# Patient Record
Sex: MALE | Race: WHITE | NOT HISPANIC OR LATINO | Employment: STUDENT | URBAN - METROPOLITAN AREA
[De-identification: names, ages, dates, MRNs, and addresses within clinical notes are randomized per-mention and may not be internally consistent; named-entity substitution may affect disease eponyms.]

---
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
## 2022-04-18 NOTE — PROGRESS NOTES
10:00 Subjective:      History was provided by the patient and mother and patient was brought in for Loss of Consciousness  .    History of Present Illness:  HPI 15 yo with pain while stooling 5 days ago. Passed out and fell on the floor. Parents found head down in tub out cold.  Came around in minutes. Some snoring noticed, no movements. Completely limp.   Called 911. Dad squeezed around chest as tried to pull up and vomited small amount of green. Pulled up and pale and awake instantly and remembers up to passing out.   EMS saw. By then color better. Ok on monitor and normal BP.  Has passed out when ill in past in hot shower. And has happened in the past when smelling food that he hated.     Review of Systems   Constitutional: Negative for appetite change and fever.   HENT: Negative for congestion and rhinorrhea.    Respiratory: Negative for cough and shortness of breath.    Gastrointestinal: Negative for diarrhea and vomiting.   Genitourinary: Negative for decreased urine volume.   Skin: Negative for rash.   Neurological: Positive for syncope, light-headedness (mild) and headaches.   Hematological: Negative for adenopathy.   Psychiatric/Behavioral: Negative for sleep disturbance.       Objective:     Physical Exam   Constitutional: He appears well-developed and well-nourished. No distress.   HENT:   Right Ear: External ear normal.   Left Ear: External ear normal.   Nose: Nose normal.   Mouth/Throat: Oropharynx is clear and moist.   Eyes: Conjunctivae are normal.   Neck: Neck supple.   Cardiovascular: Normal rate, regular rhythm, normal heart sounds and intact distal pulses.  Exam reveals no gallop.    No murmur heard.  Pulmonary/Chest: Effort normal and breath sounds normal.   Abdominal: Soft. He exhibits no distension and no mass. There is no tenderness.   Musculoskeletal: Normal range of motion.   Lymphadenopathy:     He has no cervical adenopathy.   Neurological: He displays normal reflexes. No cranial nerve deficit.  He exhibits normal muscle tone. Coordination normal.   Skin: No rash noted.   Vitals reviewed.      Assessment:        1. Syncope and collapse         Plan:        Radha SOLANO was seen today for loss of consciousness.    Diagnoses and all orders for this visit:    Syncope and collapse  -     Ekg 12-lead pediatric; Future    Bradycardia, some suggestion for Ventricular hypertrophy based on Prominent QRS  This is likely Vasovagal effects.  Has had in past.   Will see cards for prominent QRS

## 2023-11-17 NOTE — TELEPHONE ENCOUNTER
Please advise, thank you.   There are no Wet Read(s) to document. There is 1 Wet Read(s) to document.